# Patient Record
Sex: MALE | Race: WHITE
[De-identification: names, ages, dates, MRNs, and addresses within clinical notes are randomized per-mention and may not be internally consistent; named-entity substitution may affect disease eponyms.]

---

## 2018-03-01 ENCOUNTER — HOSPITAL ENCOUNTER (EMERGENCY)
Dept: HOSPITAL 25 - UCEAST | Age: 48
Discharge: HOME | End: 2018-03-01
Payer: COMMERCIAL

## 2018-03-01 VITALS — SYSTOLIC BLOOD PRESSURE: 142 MMHG | DIASTOLIC BLOOD PRESSURE: 95 MMHG

## 2018-03-01 DIAGNOSIS — M25.572: Primary | ICD-10-CM

## 2018-03-01 DIAGNOSIS — F17.210: ICD-10-CM

## 2018-03-01 PROCEDURE — 99202 OFFICE O/P NEW SF 15 MIN: CPT

## 2018-03-01 PROCEDURE — G0463 HOSPITAL OUTPT CLINIC VISIT: HCPCS

## 2018-03-01 NOTE — RAD
Indication: Left ankle pain.



3 views of left ankle demonstrates no fracture or dislocation. Ankle mortise is intact.



IMPRESSION: NO FRACTURE OF THE LEFT ANKLE IS NOTED.

## 2018-03-01 NOTE — UC
Lower Extremity/Ankle HPI





- HPI Summary


HPI Summary: 





48 year old male here with left ankle pain for one week. 


Works at [employer] but no recent physical activity.


Sharp pain over the dorsum of the foot but no injuries. 


No numbness or tingling. 








- History of Current Complaint


Chief Complaint: UCLowerExtremity


Stated Complaint: ANKLE PAIN


Time Seen by Provider: 03/01/18 11:18


Hx Obtained From: Patient


Onset/Duration: Gradual Onset


Severity Initially: Mild


Pain Intensity: 5


Aggravating Factor(s): Standing, Ambulation


Alleviating Factor(s): Rest





- Risk Factors


Gout Risk Factors: Negative





- Allergies/Home Medications


Allergies/Adverse Reactions: 


 Allergies











Allergy/AdvReac Type Severity Reaction Status Date / Time


 


No Known Allergies Allergy   Verified 03/01/18 10:28














PMH/Surg Hx/FS Hx/Imm Hx





- Surgical History


Surgical History: None





- Family History


Known Family History: Positive: None





- Social History


Alcohol Use: Weekly


Substance Use Type: None


Smoking Status (MU): Heavy Every Day Tobacco Smoker


Type: Cigarettes


Amount Used/How Often: 1ppd


Household Exposure Type: Cigarettes





Review of Systems


Constitutional: Negative


Skin: Negative


Eyes: Negative


ENT: Negative


Respiratory: Negative


Cardiovascular: Negative


Gastrointestinal: Negative


Genitourinary: Negative


Motor: Negative


Neurovascular: Negative


Musculoskeletal: Other: - left ankle pain


Neurological: Negative


Psychological: Negative


All Other Systems Reviewed And Are Negative: Yes





Physical Exam


Triage Information Reviewed: Yes


Appearance: Well-Appearing, No Pain Distress


Vital Signs: 


 Initial Vital Signs











Temp  36.8 C   03/01/18 10:28


 


Pulse  74   03/01/18 10:28


 


Resp  16   03/01/18 10:28


 


BP  142/95   03/01/18 10:28


 


Pulse Ox  99   03/01/18 10:28











Vital Signs Reviewed: Yes


Eye Exam: Normal


ENT Exam: Normal


Musculoskeletal: Positive: Other: - left ankle with FROM No ttp over navicular 

bone Pain with hyperextension SILT in s/s/sp/dp/ta


Neurological Exam: Normal


Psychological Exam: Normal


Skin Exam: Normal





Diagnostics





- Radiology


  ** No standard instances


Xray Interpretation: No Acute Changes


Radiology Interpretation Completed By: Radiologist





Lower Extremity Course/Dx





- Course


Course Of Treatment: NSAID.  Instructions for allevations





- Differential Dx/Diagnosis


Differential Diagnosis/HQI/PQRI: Arthritis, Sprain, Strain


Provider Diagnoses: Left ankle pain





Discharge





- Discharge Plan


Condition: Good


Disposition: HOME


Prescriptions: 


Ibuprofen TAB* [Motrin TAB* 400 MG] 400 mg PO Q6H PRN #20 tab


 PRN Reason: Pain


Referrals: 


No Primary Care Phys,NOPCP [Primary Care Provider] -